# Patient Record
Sex: FEMALE | Race: ASIAN | NOT HISPANIC OR LATINO | Employment: FULL TIME | URBAN - METROPOLITAN AREA
[De-identification: names, ages, dates, MRNs, and addresses within clinical notes are randomized per-mention and may not be internally consistent; named-entity substitution may affect disease eponyms.]

---

## 2021-04-06 DIAGNOSIS — Z23 ENCOUNTER FOR IMMUNIZATION: ICD-10-CM

## 2022-12-20 ENCOUNTER — OFFICE VISIT (OUTPATIENT)
Dept: URGENT CARE | Facility: CLINIC | Age: 27
End: 2022-12-20

## 2022-12-20 VITALS
RESPIRATION RATE: 20 BRPM | TEMPERATURE: 98.7 F | BODY MASS INDEX: 23 KG/M2 | SYSTOLIC BLOOD PRESSURE: 109 MMHG | WEIGHT: 106.6 LBS | OXYGEN SATURATION: 96 % | TEMPERATURE: 98.7 F | DIASTOLIC BLOOD PRESSURE: 72 MMHG | HEART RATE: 87 BPM | DIASTOLIC BLOOD PRESSURE: 72 MMHG | BODY MASS INDEX: 23 KG/M2 | HEIGHT: 57 IN | WEIGHT: 106.6 LBS | HEIGHT: 57 IN | OXYGEN SATURATION: 96 % | SYSTOLIC BLOOD PRESSURE: 109 MMHG | HEART RATE: 87 BPM | RESPIRATION RATE: 20 BRPM

## 2022-12-20 DIAGNOSIS — J02.9 SORE THROAT: ICD-10-CM

## 2022-12-20 DIAGNOSIS — R05.1 ACUTE COUGH: ICD-10-CM

## 2022-12-20 DIAGNOSIS — J06.9 VIRAL UPPER RESPIRATORY TRACT INFECTION WITH COUGH: Primary | ICD-10-CM

## 2022-12-20 DIAGNOSIS — J45.901 ASTHMA WITH ACUTE EXACERBATION, UNSPECIFIED ASTHMA SEVERITY, UNSPECIFIED WHETHER PERSISTENT: ICD-10-CM

## 2022-12-20 LAB
S PYO AG THROAT QL: NEGATIVE
S PYO AG THROAT QL: NEGATIVE

## 2022-12-20 RX ORDER — ALBUTEROL SULFATE 90 UG/1
2 AEROSOL, METERED RESPIRATORY (INHALATION) EVERY 6 HOURS PRN
Qty: 8.5 G | Refills: 0 | Status: SHIPPED | OUTPATIENT
Start: 2022-12-20

## 2022-12-20 RX ORDER — PREDNISONE 20 MG/1
60 TABLET ORAL DAILY
Qty: 15 TABLET | Refills: 0 | Status: SHIPPED | OUTPATIENT
Start: 2022-12-20 | End: 2022-12-25

## 2022-12-20 RX ORDER — FLUTICASONE PROPIONATE AND SALMETEROL 250; 50 UG/1; UG/1
1 POWDER RESPIRATORY (INHALATION) 2 TIMES DAILY
Qty: 60 BLISTER | Refills: 0 | Status: SHIPPED | OUTPATIENT
Start: 2022-12-20 | End: 2023-01-19

## 2022-12-20 RX ORDER — SERTRALINE HYDROCHLORIDE 25 MG/1
25 TABLET, FILM COATED ORAL DAILY
COMMUNITY

## 2022-12-20 NOTE — PROGRESS NOTES
[address] [facility]        NAME: Ramon Wallace is a 24 y o  female  : 1995    MRN: [id number]  DATE: 2022  TIME: 2:38 PM    Assessment and Plan   Viral upper respiratory tract infection with cough [J06 9]  1  Viral upper respiratory tract infection with cough  albuterol (ProAir HFA) 90 mcg/act inhaler    Fluticasone-Salmeterol (Advair Diskus) 250-50 mcg/dose inhaler    predniSONE 20 mg tablet    Ambulatory Referral to [facility]      2  Asthma with acute exacerbation, unspecified asthma severity, unspecified whether persistent  albuterol (ProAir HFA) 90 mcg/act inhaler    Fluticasone-Salmeterol (Advair Diskus) 250-50 mcg/dose inhaler    predniSONE 20 mg tablet    Ambulatory Referral to [facility]      3  Sore throat  POCT rapid strepA    Throat culture    Ambulatory Referral to [facility]      4  Acute cough  Cov/Flu-Collected at [place] [address] or [facility]    Ambulatory Referral to [facility]            Patient Instructions       Follow up with PCP in 3-5 days  Proceed to  ER if symptoms worsen  You are prescribed prednisone, albuterol, and advair  You are to find a family physician for follow up - you have been given a referral  You have a flu/covid test pending  You are to download [employer] for the results in 24-48 hours  You will be notified if abnormal  You are to see your PCP in 3-5 days  Go to the ED if symptoms worsen        Chief Complaint     Chief Complaint   Patient presents with   • Sore Throat     Flu like symptoms fri-sun  Gareth started sore throat with throat tightness         History of Present Illness       This is a 24year old female who has asthma and states on Friday developed flu like symptoms with low grade fever  She states that by Monday she felt better but now has a sorethroat and feels like it is closing at times  She states she hasn't used MDI's for years and used her albuterol once on  and twice on Monday    She states that she does not have a PCP  Denies n/v/d, pregnancy       Review of Systems   Review of Systems   Constitutional: Positive for fever  HENT: Positive for congestion and sore throat  Eyes: Negative  Respiratory: Positive for cough  Cardiovascular: Negative  Gastrointestinal: Negative  Endocrine: Negative  Genitourinary: Negative  Musculoskeletal: Negative  Skin: Negative  Allergic/Immunologic: Negative  Neurological: Negative  Hematological: Negative  Psychiatric/Behavioral: Negative  Current Medications       Current Outpatient Medications:   •  albuterol (ProAir HFA) 90 mcg/act inhaler, Inhale 2 puffs every 6 (six) hours as needed for wheezing or shortness of breath, Disp: 8 5 g, Rfl: 0  •  Fluticasone-Salmeterol (Advair Diskus) 250-50 mcg/dose inhaler, Inhale 1 puff 2 (two) times a day Rinse mouth after use , Disp: 60 blister, Rfl: 0  •  predniSONE 20 mg tablet, Take 3 tablets (60 mg total) by mouth daily for 5 days, Disp: 15 tablet, Rfl: 0  •  sertraline (ZOLOFT) 25 mg tablet, Take 25 mg by mouth daily, Disp: , Rfl:     Current Allergies     Allergies as of 12/20/2022   • (No Known Allergies)            The following portions of the patient's history were reviewed and updated as appropriate: allergies, current medications, past family history, past medical history, past social history, past surgical history and problem list      Past Medical History:   Diagnosis Date   • Asthma        Past Surgical History:   Procedure Laterality Date   • FRACTURE SURGERY         History reviewed  No pertinent family history  Medications have been verified  Objective   /72   Pulse 87   Temp 98 7 °F (37 1 °C)   Resp 20   Ht 4' 9" (1 448 m)   Wt 48 4 kg (106 lb 9 6 oz)   SpO2 96%   BMI 23 07 kg/m²   No LMP recorded  Physical Exam     Physical Exam  Vitals and nursing note reviewed  Constitutional:       General: She is not in acute distress  Appearance: She is well-developed and normal weight  She is not ill-appearing, toxic-appearing or diaphoretic  HENT:      Head: Normocephalic and atraumatic  Right Ear: Tympanic membrane and ear canal normal       Left Ear: Tympanic membrane and ear canal normal       Nose: Congestion present  No rhinorrhea  Mouth/Throat:      Mouth: Mucous membranes are moist  No oral lesions  Pharynx: Oropharynx is clear  Uvula midline  No pharyngeal swelling, oropharyngeal exudate, posterior oropharyngeal erythema or uvula swelling  Tonsils: No tonsillar exudate or tonsillar abscesses  Comments: Able to swallow own saliva, no drooling   Cardiovascular:      Rate and Rhythm: Normal rate and regular rhythm  Heart sounds: Normal heart sounds  No murmur heard  Pulmonary:      Effort: Pulmonary effort is normal  No respiratory distress  Breath sounds: Normal breath sounds  No stridor  No wheezing, rhonchi or rales  Chest:      Chest wall: No tenderness  Musculoskeletal:      Cervical back: Normal range of motion and neck supple  Skin:     General: Skin is warm and dry  Capillary Refill: Capillary refill takes less than 2 seconds  Neurological:      General: No focal deficit present  Mental Status: She is alert and oriented to person, place, and time     Psychiatric:         Mood and Affect: Mood normal          Behavior: Behavior normal

## 2022-12-20 NOTE — PATIENT INSTRUCTIONS
You are prescribed prednisone, albuterol, and advair  You are to find a family physician for follow up - you have been given a referral  You have a flu/covid test pending  You are to download [employer] for the results in 24-48 hours    You will be notified if abnormal  You are to see your PCP in 3-5 days  Go to the ED if symptoms worsen

## 2022-12-21 LAB
FLUAV RNA RESP QL NAA+PROBE: NEGATIVE
FLUAV RNA RESP QL NAA+PROBE: NEGATIVE
FLUBV RNA RESP QL NAA+PROBE: NEGATIVE
FLUBV RNA RESP QL NAA+PROBE: NEGATIVE
SARS-COV-2 RNA RESP QL NAA+PROBE: NEGATIVE
SARS-COV-2 RNA RESP QL NAA+PROBE: NEGATIVE

## 2022-12-22 LAB
BACTERIA THROAT CULT: NORMAL
BACTERIA THROAT CULT: NORMAL